# Patient Record
Sex: FEMALE | Race: WHITE | NOT HISPANIC OR LATINO | Employment: FULL TIME | URBAN - METROPOLITAN AREA
[De-identification: names, ages, dates, MRNs, and addresses within clinical notes are randomized per-mention and may not be internally consistent; named-entity substitution may affect disease eponyms.]

---

## 2023-06-10 ENCOUNTER — NURSE TRIAGE (OUTPATIENT)
Dept: NURSING | Facility: CLINIC | Age: 38
End: 2023-06-10

## 2023-06-10 NOTE — TELEPHONE ENCOUNTER
"[facility] clinic  Severely painful hemorrhoids x 1 week. Appointment scheduled on Monday @ Colon-rectal clinic in [facility]. Pain is becoming unbearable. Constant, severe, =\"6-7\". She has been taking  Ibuprofen and Tylenol. Neosporin with Lidocaine, Tucks wipes, sitz baths with saline.   Bleeding from hemorrhoids= she passes blood only when passing stool. It comes out enough to color toilet water red, every time she has a BM. (Once a day).   Stools are usually loose=1-2 times per day. She is not taking any laxatives or stool softeners. Stool is mixed with blood= burgundy or dark, possibly black. She is taking iron supplement every day.     Triaged to a disposition of See PCP within 24 hrs. She states she has an UC in [place] she can go to tomorrow. If pain becomes unmanageable tonight, then she will go to the ER.    Siri Sotelo RN Triage Nurse Advisor 6:41 PM 6/10/2023  Reason for Disposition    Rectal bleeding, bloody stools, or blood in stool (bowel movement)    MODERATE rectal bleeding (small blood clots, passing blood without stool, or toilet water turns red)    Additional Information    Negative: Shock suspected (e.g., cold/pale/clammy skin, too weak to stand, low BP, rapid pulse)    Negative: Difficult to awaken or acting confused (e.g., disoriented, slurred speech)    Negative: Passed out (i.e., lost consciousness, collapsed and was not responding)    Negative: [1] Vomiting AND [2] contains red blood or black (\"coffee ground\") material  (Exception: few red streaks in vomit that only happened once)    Negative: Sounds like a life-threatening emergency to the triager    Negative: Diarrhea is main symptom    Negative: Stool color other than brown or tan is main concern  (no bleeding and no melena)    Negative: SEVERE rectal bleeding (large blood clots; constant or on and off bleeding)    Negative: SEVERE dizziness (e.g., unable to stand, requires support to walk, feels like passing out now)    " Negative: [1] MODERATE rectal bleeding (small blood clots, passing blood without stool, or toilet water turns red) AND [2] more than once a day    Negative: Pale skin (pallor) of new-onset or worsening    Negative: Black or tarry bowel movements (Exception: chronic-unchanged black-grey bowel movements AND is taking iron pills or Pepto-bismol)    Negative: [1] Constant abdominal pain AND [2] present > 2 hours    Negative: Rectal foreign body (i.e., now or within past week;  inserted or swallowed)    Negative: High-risk adult (e.g., prior surgery on aorta, abdominal aortic aneurysm)    Negative: Taking Coumadin (warfarin) or other strong blood thinner, or known bleeding disorder (e.g., thrombocytopenia)    Negative: Known cirrhosis of the liver (or history of liver failure or ascites)    Negative: [1] Colonoscopy AND [2] in past 72 hours    Negative: Patient sounds very sick or weak to the triager    Protocols used: STOOLS - UNUSUAL COLOR-A-AH, RECTAL BLEEDING-A-AH

## 2023-10-22 ENCOUNTER — HEALTH MAINTENANCE LETTER (OUTPATIENT)
Age: 38
End: 2023-10-22

## 2023-12-13 ENCOUNTER — APPOINTMENT (OUTPATIENT)
Dept: CT IMAGING | Facility: CLINIC | Age: 38
End: 2023-12-13
Attending: EMERGENCY MEDICINE
Payer: COMMERCIAL

## 2023-12-13 ENCOUNTER — HOSPITAL ENCOUNTER (EMERGENCY)
Facility: CLINIC | Age: 38
Discharge: HOME | End: 2023-12-13
Attending: EMERGENCY MEDICINE | Admitting: EMERGENCY MEDICINE
Payer: COMMERCIAL

## 2023-12-13 VITALS
RESPIRATION RATE: 24 BRPM | WEIGHT: 293 LBS | OXYGEN SATURATION: 94 % | DIASTOLIC BLOOD PRESSURE: 91 MMHG | HEART RATE: 113 BPM | SYSTOLIC BLOOD PRESSURE: 136 MMHG | BODY MASS INDEX: 39.68 KG/M2 | TEMPERATURE: 98.6 F | HEIGHT: 72 IN

## 2023-12-13 DIAGNOSIS — J18.9 PNEUMONIA OF BOTH LOWER LOBES DUE TO INFECTIOUS ORGANISM: ICD-10-CM

## 2023-12-13 LAB
D DIMER PPP FEU-MCNC: 1.39 UG/ML FEU (ref 0–0.5)
HOLD SPECIMEN: NORMAL
HOLD SPECIMEN: NORMAL

## 2023-12-13 PROCEDURE — 36415 COLL VENOUS BLD VENIPUNCTURE: CPT | Performed by: EMERGENCY MEDICINE

## 2023-12-13 PROCEDURE — 85379 FIBRIN DEGRADATION QUANT: CPT | Performed by: EMERGENCY MEDICINE

## 2023-12-13 PROCEDURE — 250N000009 HC RX 250: Performed by: EMERGENCY MEDICINE

## 2023-12-13 PROCEDURE — 99284 EMERGENCY DEPT VISIT MOD MDM: CPT | Performed by: EMERGENCY MEDICINE

## 2023-12-13 PROCEDURE — 250N000011 HC RX IP 250 OP 636: Performed by: EMERGENCY MEDICINE

## 2023-12-13 PROCEDURE — 71275 CT ANGIOGRAPHY CHEST: CPT

## 2023-12-13 PROCEDURE — 99285 EMERGENCY DEPT VISIT HI MDM: CPT | Mod: 25 | Performed by: EMERGENCY MEDICINE

## 2023-12-13 RX ORDER — IOPAMIDOL 755 MG/ML
500 INJECTION, SOLUTION INTRAVASCULAR ONCE
Status: COMPLETED | OUTPATIENT
Start: 2023-12-13 | End: 2023-12-13

## 2023-12-13 RX ADMIN — IOPAMIDOL 98 ML: 755 INJECTION, SOLUTION INTRAVENOUS at 22:38

## 2023-12-13 RX ADMIN — SODIUM CHLORIDE 70 ML: 9 INJECTION, SOLUTION INTRAVENOUS at 22:38

## 2023-12-13 ASSESSMENT — ACTIVITIES OF DAILY LIVING (ADL)
ADLS_ACUITY_SCORE: 35
ADLS_ACUITY_SCORE: 33

## 2023-12-14 NOTE — ED PROVIDER NOTES
"  History     Chief Complaint   Patient presents with    Shortness of Breath     HPI  Crissy Charles is a 38 year old female who presents from urgent care over concern of increased work of breathing.  She said that she had a cough and shortness of breath starting Saturday, and got worse on Monday.  She is concerned that she had similar symptoms 2 months ago, and was placed on Lasix for \"fluid in the lungs\".  She said at that time it really helped, and she is still taking Lasix, but has followed up since with pulmonology and says that things are going well.  Has also had workup with cardiology, including an echo, and is due to have a stress test and MRI on outpatient basis.  She says she had to cancel that since she has been feeling ill.  Had difficulty catching her breath and feels that she has to be very deliberate and taking deep breaths in order to breathe.  Was using a pulse oximeter that she had and noticed oxygen levels of 90 to 95%.  Her  was ill just before her, and her child was also ill with similar symptoms.  She had gone to urgent care earlier at the recommendations of her primary provider's office, and workup there showed pneumonia in her lower lungs.  She was told to continue the antibiotic.  Said that they did not see any fluid on her lungs.  She also says her blood work that was done was normal, but they advised her to come to the ER because they were concerned about possible blood clot as cause for symptoms.  They recommended that she get a D-dimer and may be a CT scan.    Copy of visit from urgent care earlier today:  Reason for Visit    Reason for Visit -  Reason Comments   URI symptoms Cough and SOB starting Monday. Had similar symptoms 2 months ago and followed up with pulmonology. Had imaging and testing done that was normal. Put on Lasix for fluid in lungs. Currently taking antibiotic for ear infection. Also on Prednisone     Encounter Details    Encounter Details  Date Type Department " Care Team (Latest Contact Info) Description   12/13/2023 6:00 PM CST Urgent Care Visit [facility] [place]   [address], [address]   [place] [address]   [phone]  Maxine Ocasio APRN, CNP   [address]   [place] [id number] [phone] (Work)   [phone] (Fax)  Shortness of breath (Primary Dx);  Cough, unspecified type     Social History  - documented as of this encounter  Social History  Tobacco Use Types Packs/Day Years Used Date   Smoking Tobacco: Never           Passive Smoke Exposure: Never           Smokeless Tobacco: Never             Social History  Tobacco Cessation: Counseling Given: Not Answered     Social History  Sex and Gender Information Value Date Recorded   Sex Assigned at Birth Not on file     Gender Identity Not on file     Sexual Orientation Not on file       Last Filed Vital Signs  - documented in this encounter  Last Filed Vital Signs  Vital Sign Reading Time Taken Comments   Blood Pressure 114/78 12/13/2023 6:05 PM CST     Pulse 101 12/13/2023 6:05 PM CST     Temperature 37.4  C (99.3  F) 12/13/2023 6:05 PM CST     Respiratory Rate - -     Oxygen Saturation 93% 12/13/2023 6:05 PM CST     Inhaled Oxygen Concentration - -     Weight - -     Height - -     Body Mass Index - -       Patient Instructions  - documented in this encounter  Patient Instructions  Maxine Ocasio APRN, CNP - 12/13/2023 6:00 PM CST   Formatting of this note might be different from the original.  D dimer to consider the differential of a blood clot. If D dimer is negative then no blood clot. If D-dimer is elevated, then CT scan should be obtained to rule out a blood clot.  Electronically signed by Maxine Ocasio APRN, CNP at 12/13/2023 8:07 PM CST     Ordered Prescriptions  - documented in this encounterReconcile with Patient's Chart  Ordered Prescriptions  Prescription Sig Dispensed Refills Start Date End Date   doxycycline hyclate (VIBRAMYCIN) 100 mg  oral capsule   Indications: Shortness of breath Take 1 capsule (100 mg) by mouth twice a day for 5 days. 10 capsule  0 12/13/2023 12/18/2023     Plan of Treatment  - documented as of this encounter  Plan of Treatment - Pending Results  Pending Results  Name Type Priority Associated Diagnoses Date/Time   RAPID PCR VIRAL TEST: COVID-19, INFLUENZA A/B OP Microbiology Routine Shortness of breath  12/13/2023 6:54 PM CST   EKG WITH INTERPRETATION/REPORT EKG Routine Shortness of breath  12/13/2023 7:37 PM CST     Procedures  - documented in this encounter  Procedures  Procedure Name Priority Date/Time Associated Diagnosis Comments   EKG WITH INTERPRETATION/REPORT Routine 12/13/2023 7:37 PM CST Shortness of breath      CBC/DIFFERENTIAL OP Routine 12/13/2023 6:54 PM CST Shortness of breath  Results for this procedure are in the results section.      Lab Results  - documented in this encounter  (ABNORMAL) CBC/DIFFERENTIAL OP (12/13/2023 6:54 PM CST)  Lab Results - (ABNORMAL) CBC/DIFFERENTIAL OP (12/13/2023 6:54 PM CST)  Component Value Ref Range Test Method Analysis Time Performed At Pathologist Signature   WBC OP 10.5 4.3 - 10.8 K/UL   12/13/2023 7:21 PM [facility]     RBC OP 4.71 4.20 - 5.40 M/UL   12/13/2023 7:21 PM [facility]     HEMOGLOBIN OP 13.2 12.0 - 16.0 gm/dL   12/13/2023 7:21 PM [facility]     HEMATOCRIT OP 45.1 36.0 - 48.0 %   12/13/2023 7:21 PM [facility]     MCV OP 96 80 - 100 fL   12/13/2023 7:21 PM [facility]     MCH OP 28.0 27.0 - 33.0 pg   12/13/2023 7:21 PM [facility]     MCHC OP 29.3 (L) 33.0 - 36.0 gm/dL   12/13/2023 7:21 PM [facility]     RDW OP 16.8 (H) 11.5 - 14.5 %   12/13/2023 7:21 PM CST [facility]     PLATELET COUNT  150 - 400  K/UL   12/13/2023 7:21 PM [facility]     MPV OP 10.2 6.5 - 12.0 fL   12/13/2023 7:21 PM [facility]     PMN % OP 74.5 %   12/13/2023 7:21 PM [facility]     LYMPHOCYTE % OP 15.3 %   12/13/2023 7:21 PM [facility]     MONOCYTE % OP 8.4 %   12/13/2023 7:21 PM [facility]     EOSINOPHIL % OP 1.5 %   12/13/2023 7:21 PM [facility]     BASOPHIL % OP 0.2 %   12/13/2023 7:21 PM [facility]     PMN ABSOLUTE OP 7.81 (H) 1.80 - 7.80 K/uL   12/13/2023 7:21 PM [facility]     LYMPHOCYTE ABSOLUTE OP 1.61 1.00 - 4.00 K/uL   12/13/2023 7:21 PM [facility]     MONOCYTE ABSOLUTE OP 0.88 0.00 - 1.00 K/uL   12/13/2023 7:21 PM [facility]     EOSINOPHIL ABSOLUTE OP 0.16 0.00 - 0.45 K/uL   12/13/2023 7:21 PM [facility]     BASOPHIL ABSOLUTE OP 0.02 0.00 - 0.20 K/uL   12/13/2023 7:21 PM [facility]       Lab Results - (ABNORMAL) CBC/DIFFERENTIAL OP (12/13/2023 6:54 PM CST)  Specimen (Source) Anatomical Location / Laterality Collection Method / Volume Collection Time Received Time   Blood   Venipuncture / Unknown 12/13/2023 6:54 PM CST 12/13/2023 6:54 PM CST     Lab Results - (ABNORMAL) CBC/DIFFERENTIAL OP (12/13/2023 6:54 PM CST)  Narrative         Lab Results - (ABNORMAL) CBC/DIFFERENTIAL OP (12/13/2023 6:54 PM CST)  Authorizing Provider Result Type   Maxine Ocasio APRN, CNP HEMATOLOGY ORDERABLE     Lab Results - (ABNORMAL) CBC/DIFFERENTIAL OP (12/13/2023 6:54 PM CST)  Performing Organization Address City/State/ZIP Code Phone Number   [facility]  [address] [place] [address]  [phone]       Imaging  Results  - documented in this encounter  XR CHEST PA & LAT (12/13/2023 7:11 PM CST)  Imaging Results - XR CHEST PA & LAT (12/13/2023 7:11 PM CST)  Anatomical Region Laterality Modality   Chest   Radiographic Imaging     Imaging Results - XR CHEST PA & LAT (12/13/2023 7:11 PM CST)  Specimen (Source) Anatomical Location / Laterality Collection Method / Volume Collection Time Received Time         12/13/2023 7:16 PM CST       Imaging Results - XR CHEST PA & LAT (12/13/2023 7:11 PM CST)  Impressions   12/13/2023 7:17 PM CST   IMPRESSION:    Increased opacities in the lingula and both lower lobe suspicious for infiltrate.      REPORT SIGNED BY DR. Ezequiel Caballero      Imaging Results - XR CHEST PA & LAT (12/13/2023 7:11 PM CST)  Narrative   12/13/2023 7:17 PM CST   EXAM: X-RAY CHEST (two views)    DATE: 12/13/2023 6:54 PM    COMPARISON: None    CLINICAL DATA: .    ADDITIONAL CLINICAL DATA: R06.02 Shortness of breath R05.9 Cough, unspecified    TECHNIQUE: PA and lateral views of the chest were obtained.    FINDINGS:    Mediastinum:  The cardiomediastinal contours are within normal limits.    Lungs: Increased opacities in the lingula and both lower lobes    Pleural spaces: No pleural effusion.          Imaging Results - XR CHEST PA & LAT (12/13/2023 7:11 PM CST)  Procedure Note   Ezequiel Caballero MD - 12/13/2023   Formatting of this note might be different from the original.  EXAM: X-RAY CHEST (two views)    DATE: 12/13/2023 6:54 PM    COMPARISON: None    CLINICAL DATA: .    ADDITIONAL CLINICAL DATA: R06.02 Shortness of breath R05.9 Cough, unspecified    TECHNIQUE: PA and lateral views of the chest were obtained.    FINDINGS:    Mediastinum: The cardiomediastinal contours are within normal limits.    Lungs: Increased opacities in the lingula and both lower lobes    Pleural spaces: No pleural effusion.      IMPRESSION  IMPRESSION:    Increased opacities in the lingula and both lower lobe suspicious for infiltrate.      REPORT SIGNED  BY DR. Ezequiel Caballero     Imaging Results - XR CHEST PA & LAT (12/13/2023 7:11 PM CST)  Authorizing Provider Result Type   Maxine NEWBERRY Amarjit APRN, CNP XRAY ORDERABLE      Visit Diagnoses  - documented in this encounter  Visit Diagnoses  Diagnosis   Shortness of breath - Primary    Cough, unspecified type      Historical Medications  - added in this encounterReconcile with Patient's Chart  This list may reflect changes made after this encounter.    Historical Medications  Medication Sig Dispensed Refills Start Date End Date   PREDNISONE ORAL  Take by mouth.   0       amoxicillin-pot clavulanate (AUGMENTIN) 875-125 mg oral tablet  Take 1 tablet by mouth twice a day.   0 12/04/2023 12/14/2023   rOPINIRole (REQIUP) 3 mg oral Tab  Take 1 tablet (3 mg) by mouth.   0 10/16/2023     lisinopriL (PRINIVIL) 10 mg oral tablet  Take 1 tablet (10 mg) by mouth once daily.   0       furosemide (LASIX) 20 mg oral tablet  Take 1 tablet (20 mg) by mouth Daily.   0 10/30/2023 10/29/2024       Allergies:  No Known Allergies    Problem List:    There are no problems to display for this patient.       Past Medical History:    No past medical history on file.    Past Surgical History:    No past surgical history on file.    Family History:    No family history on file.    Social History:  Marital Status:   [2]        Medications:    No current outpatient medications on file.        Review of Systems   All other systems reviewed and are negative.      Physical Exam   BP: (!) 136/91  Pulse: 113  Temp: 98.6  F (37  C)  Resp: 24  Height: 182.9 cm (6')  Weight: (!) 201.9 kg (445 lb)  SpO2: 94 %      Physical Exam  Vitals and nursing note reviewed.   Constitutional:       Appearance: She is obese.   Cardiovascular:      Rate and Rhythm: Normal rate and regular rhythm.   Pulmonary:      Breath sounds: Normal breath sounds. No stridor. No wheezing.      Comments: Increased work of breathing  Musculoskeletal:      Right lower leg: No tenderness.       Left lower leg: No tenderness.   Neurological:      Mental Status: She is alert.         ED Course                 Procedures              Critical Care time:  none               Results for orders placed or performed during the hospital encounter of 12/13/23 (from the past 24 hour(s))   D dimer quantitative   Result Value Ref Range    D-Dimer Quantitative 1.39 (H) 0.00 - 0.50 ug/mL FEU    Narrative    This D-dimer assay is intended for use in conjunction with a clinical pretest probability assessment model to exclude pulmonary embolism (PE) and deep venous thrombosis (DVT) in outpatients suspected of PE or DVT. The cut-off value is 0.50 ug/mL FEU.   [place] Draw    Narrative    The following orders were created for panel order [place] Draw.  Procedure                               Abnormality         Status                     ---------                               -----------         ------                     Extra Green Top (Lithium...[[id number]]                      Final result               Extra Purple Top Tube[[id number]]                            Final result                 Please view results for these tests on the individual orders.   Extra Green Top (Lithium Heparin) Tube   Result Value Ref Range    Hold Specimen JIC    Extra Purple Top Tube   Result Value Ref Range    Hold Specimen JIC    CT Chest Pulmonary Embolism w Contrast    Narrative    EXAM: CT CHEST WITH CONTRAST - PULMONARY EMBOLISM PROTOCOL   LOCATION: [facility]  DATE/TIME: 12/13/2023 10:49 PM CST    INDICATION: Shortness of breath. Cough. Elevated D-dimer.  COMPARISON: None.    TECHNIQUE: CT angiogram chest during arterial phase injection IV contrast. 2D and 3D MIP reconstructions were performed by the CT technologist. Dose reduction techniques were used.   CONTRAST: 98 mL Isovue-370.    FINDINGS:    ANGIOGRAM CHEST: Suboptimal contrast bolus timing mildly limits detection of subsegmental pulmonary  emboli. No visualized pulmonary emboli. The thoracic aorta is normal in caliber without dissection.    LUNGS AND PLEURA: Several small patchy consolidative and groundglass opacities scattered within the mid and lower lungs bilaterally. There is sparing of the lung bases.    MEDIASTINUM/AXILLAE: Unremarkable.    CORONARY ARTERY CALCIFICATION: Absent.    MUSCULOSKELETAL: No acute findings.    UPPER ABDOMEN: Prior gastric surgery.      Impression    IMPRESSION:   1.  Several small patchy consolidative and groundglass opacities scattered within the mid and lower lungs bilaterally. These are nonspecific, but likely infectious or inflammatory in etiology.  2.  No visualized pulmonary embolus. Note that detection of subsegmental emboli is mildly limited due to suboptimal contrast bolus timing.       Medications   iopamidol (ISOVUE-370) solution 500 mL (98 mLs Intravenous $Given 12/13/23 2238)   sodium chloride 0.9 % bag 100 mL for CT scan flush use (70 mLs Intravenous $Given 12/13/23 2238)       Assessments & Plan (with Medical Decision Making)  Crissy 38-year-old female presenting for concern of shortness of breath present for the last few days.  Had been evaluated at urgent care and advised to come to the ED for D-dimer and rule out of PE.  See history and physical exam as above  Obese 38-year-old female in no acute distress, is taking measured and deeper breaths, but does not appear to have significant accessory muscle usage, no audible stridor or wheezing, has clear lung sounds throughout all lung fields, and is oxygenating at 94% on room air.  Will start by checking a D-dimer, and if it is positive can proceed with peripheral IV to get a CT scan.  She is agreeable to this plan.  The labs and imaging from her urgent care visit earlier were reviewed, copied above  D-dimer was elevated at 1.39.  Even adjusting for age, this is elevated, so we will plan to get a CT scan.  Updated the patient on the results and the need to  get a peripheral IV for CT.  CT results as above.  Several small patchy consolidative and groundglass opacities within the mid and lower lungs that may be infectious versus inflammatory.  No evidence of PE visualized.  Patient has not required supplemental oxygen while here in the ED.  Updated her on CT findings.  Recommended that she complete the course of antibiotics that was prescribed to her by urgent care.  She should follow-up with pulmonology and her cardiologist as well as her primary doctor in clinic.  Discussed signs and symptoms that would indicate she should return to the emergency room for evaluation.  Discharged in stable condition     I have reviewed the nursing notes.    I have reviewed the findings, diagnosis, plan and need for follow up with the patient.           Medical Decision Making  The patient's presentation was of low complexity (an acute and uncomplicated illness or injury).    The patient's evaluation involved:  review of 2 test result(s) ordered prior to this encounter (see separate area of note for details)  ordering and/or review of 2 test(s) in this encounter (see separate area of note for details)    The patient's management necessitated only low risk treatment.        There are no discharge medications for this patient.      Final diagnoses:   Pneumonia of both lower lobes due to infectious organism       12/13/2023   [facility] EMERGENCY DEPT       April Vanegas DO  12/14/23 0201

## 2023-12-14 NOTE — DISCHARGE INSTRUCTIONS
Even though your D-dimer was high, CT scan did not show any sign of blood clot in your lungs.  They did notice some inflammatory changes at lung bases, and you should complete the courses of antibiotics that were previously prescribed to you    Follow-up with your primary provider in clinic, and you may need to follow-up with your pulmonologist and/or cardiologist in the near future    If you develop any new or worsening symptoms do not hesitate to return to the emergency room for evaluation

## 2023-12-14 NOTE — ED TRIAGE NOTES
Pt was just seen at  and told to come in to have a d dimer checked. Has had SOB issues for a few months. Had an xray while there and was started on antibiotics for pneumonia. COVID/Influenza test pending

## 2024-03-10 ENCOUNTER — HEALTH MAINTENANCE LETTER (OUTPATIENT)
Age: 39
End: 2024-03-10

## 2024-12-15 ENCOUNTER — HEALTH MAINTENANCE LETTER (OUTPATIENT)
Age: 39
End: 2024-12-15